# Patient Record
Sex: FEMALE | Race: OTHER | Employment: FULL TIME | URBAN - METROPOLITAN AREA
[De-identification: names, ages, dates, MRNs, and addresses within clinical notes are randomized per-mention and may not be internally consistent; named-entity substitution may affect disease eponyms.]

---

## 2024-05-30 ENCOUNTER — OFFICE VISIT (OUTPATIENT)
Dept: OBGYN CLINIC | Facility: CLINIC | Age: 39
End: 2024-05-30

## 2024-05-30 VITALS
BODY MASS INDEX: 30.4 KG/M2 | DIASTOLIC BLOOD PRESSURE: 84 MMHG | SYSTOLIC BLOOD PRESSURE: 120 MMHG | WEIGHT: 161 LBS | HEIGHT: 61 IN

## 2024-05-30 DIAGNOSIS — Z01.419 ENCOUNTER FOR GYNECOLOGICAL EXAMINATION WITHOUT ABNORMAL FINDING: Primary | ICD-10-CM

## 2024-05-30 DIAGNOSIS — Z12.31 ENCOUNTER FOR SCREENING MAMMOGRAM FOR MALIGNANT NEOPLASM OF BREAST: ICD-10-CM

## 2024-05-30 PROCEDURE — 99386 PREV VISIT NEW AGE 40-64: CPT | Performed by: OBSTETRICS & GYNECOLOGY

## 2024-05-30 PROCEDURE — 87624 HPV HI-RISK TYP POOLED RSLT: CPT | Performed by: OBSTETRICS & GYNECOLOGY

## 2024-05-30 NOTE — PROGRESS NOTES
GYN H&P   NEW PT    2024  6:17 PM    CC: Patient is here for annual.     HPI: Patient is a 39 year old  for annual. Last pap 2012    Menses: once a month, no heavy bleeding or pain    Does not want anything for birth control. Would be okay if she b/c pregnant.       Patient's last menstrual period was 2024.    OB History    Para Term  AB Living   2 2 1 1   2   SAB IAB Ectopic Multiple Live Births           2      # Outcome Date GA Lbr [place]/2nd Weight Sex Type Anes PTL Lv   2   33w0d  4 lb 5 oz (1.956 kg) F Caesarean   ABE   1 Term    7 lb 3 oz (3.26 kg) M NORMAL SPONT   ABE       GYN hx:    Hx Prior Abnormal Pap: No  Pap Result Notes: Per pt last pap         History reviewed. No pertinent past medical history.  Past Surgical History:   Procedure Laterality Date           No Known Allergies  Family History   Problem Relation Age of Onset    Diabetes Mother     Other (Stomach cancer) Maternal Grandmother     Cancer Maternal Grandmother         stomach    Breast Cancer Neg     Ovarian Cancer Neg     Colon Cancer Neg      Social History     Socioeconomic History    Marital status:    Occupational History    Occupation: accounts payable   Tobacco Use    Smoking status: Never    Smokeless tobacco: Never   Vaping Use    Vaping status: Never Used   Substance and Sexual Activity    Alcohol use: Yes     Comment: Occ.    Drug use: Never    Sexual activity: Yes     Partners: Male     Social History     Social History Narrative    Live with  and kids    And feels safe    No hx of abuse       Medications reviewed. See active list.     /84   Ht 61\"   Wt 161 lb (73 kg)   LMP 2024   BMI 30.42 kg/m²       Exam:   GENERAL: well developed, well nourished, in no apparent distress  SKIN: no rashes, no suspicious lesions  HEENT: normal  NECK: supple; no thyroidmegaly, no adenopathy  BREASTS: symmetrical, nontender, no palpable masses or nodes, no  nipple discharge, no skin changes, no dippling, no palpable axillary adenopathy  ABDOMEN: Soft, non distended; non tender, no masses.  Liver and spleen non-tender, no enlargement. No palpable hernias  GYNE/:  External Genitalia: Normal appearing, no lesions, normal hair distribution   Urethral meatus appear wnl, no abnormal discharge or lesions noted.   Bladder: well supported, urethra wnl, no palpable tenderness or masses, no discharge  Vagina: normal pink mucosa, no lesions, normal clear discharge.   Uterus: midline, mobile, non-tender, firm and smooth  Cervix: pink, no lesions grossly visible, no discharge  Adnexa: non tender, no palpable masses, normal size  Anus:  No lesions or visible hemorrhoids        A/P: Patient is 39 year old female     1. Encounter for gynecological examination without abnormal finding    2. Encounter for screening mammogram for malignant neoplasm of breast  - [facility] CATHY 2D+3D SCREENING BILAT (CPT=77067/78749); Future      Saranya De Anda MD

## 2024-05-31 LAB — HPV I/H RISK 1 DNA SPEC QL NAA+PROBE: NEGATIVE

## 2024-07-10 ENCOUNTER — TELEPHONE (OUTPATIENT)
Dept: OBGYN CLINIC | Facility: CLINIC | Age: 39
End: 2024-07-10

## (undated) NOTE — MR AVS SNAPSHOT
After Visit Summary   5/30/2024    Yani Menjivar   MRN: [id number]           Visit Information     Date & Time  5/30/2024  6:00 PM Provider  Saranya De Anda MD Department  [facility] - OB/GYN Dept. Phone  [phone]      Your Vitals Were  Most recent update: 5/30/2024  6:26 PM    BP   120/84    Ht   61\"    Wt   161 lb    LMP   05/03/2024    BMI   30.42 kg/m²         Allergies as of 5/30/2024  Review status set to Review Complete on 5/30/2024   No Known Allergies     Diagnoses for This Visit    Encounter for gynecological examination without abnormal finding   [001930]  -  Primary  Encounter for screening mammogram for malignant neoplasm of breast   [[id number]]             We Ordered the Following     Normal Orders This Visit    Hpv Dna  High Risk , Thin Prep Collect [[id number] CUSTOM]     THINPREP PAP SMEAR ONLY [[id number] CUSTOM]     ThinPrep PAP Smear [[id number] CUSTOM]     Future Labs/Procedures Expected by Expires    Hpv Dna  High Risk , Thin Prep Collect [[id number] CUSTOM]  5/30/2024 5/30/2025    ARLENE CATHY 2D+3D SCREENING BILAT (CPT=77067/97229) [COMBO CPT(R)]  5/30/2024 (Approximate) 5/30/2025    ThinPrep PAP Smear [[id number] CUSTOM]  5/30/2024 5/30/2025      Imaging Scheduling Instructions     Around May 30, 2024   Imaging:   ARLENE CATHY 2D+3D SCREENING BILAT (CPT=77067/10783)    Instructions: Your order will generate a \"Scheduling Ticket\" that will be available in [employer] to schedule on your own at a time most convenient to you.      If you do not have a [employer] Account, or if you prefer to speak with someone to schedule your appointment, please call [facility] Central Scheduling at [phone].                  Did you know that [facility] primary care physicians now offer Video Visits through [employer] for adult patients for a variety of conditions such as allergies, back pain and cold symptoms? Skip the drive and waiting room and online chat with a doctor  face-to-face using your web-cam enabled computer or mobile device wherever you are. Video Visits cost $50 and can be paid hassle-free using a credit, debit, or health savings card.  Not active on [employer]? Ask us how to get signed up today!          If you receive a survey from [employer], please take a few minutes to complete it and provide feedback. We strive to deliver the best patient experience and are looking for ways to make improvements. Your feedback will help us do so. For more information on [employer], please visit [email or web address]           No text in SmartText           No text in SmartText